# Patient Record
Sex: FEMALE | Race: BLACK OR AFRICAN AMERICAN | Employment: FULL TIME | URBAN - METROPOLITAN AREA
[De-identification: names, ages, dates, MRNs, and addresses within clinical notes are randomized per-mention and may not be internally consistent; named-entity substitution may affect disease eponyms.]

---

## 2023-10-30 ENCOUNTER — LAB ENCOUNTER (OUTPATIENT)
Dept: LAB | Age: 63
End: 2023-10-30
Attending: INTERNAL MEDICINE
Payer: COMMERCIAL

## 2023-10-30 ENCOUNTER — HOSPITAL ENCOUNTER (OUTPATIENT)
Dept: GENERAL RADIOLOGY | Age: 63
Discharge: HOME | End: 2023-10-30
Attending: INTERNAL MEDICINE
Payer: COMMERCIAL

## 2023-10-30 ENCOUNTER — OFFICE VISIT (OUTPATIENT)
Dept: RHEUMATOLOGY | Facility: CLINIC | Age: 63
End: 2023-10-30

## 2023-10-30 ENCOUNTER — TELEPHONE (OUTPATIENT)
Dept: RHEUMATOLOGY | Facility: CLINIC | Age: 63
End: 2023-10-30

## 2023-10-30 VITALS
RESPIRATION RATE: 16 BRPM | DIASTOLIC BLOOD PRESSURE: 66 MMHG | HEART RATE: 68 BPM | WEIGHT: 243 LBS | HEIGHT: 68 IN | BODY MASS INDEX: 36.83 KG/M2 | OXYGEN SATURATION: 99 % | SYSTOLIC BLOOD PRESSURE: 128 MMHG | TEMPERATURE: 98 F

## 2023-10-30 DIAGNOSIS — E11.9 TYPE 2 DIABETES MELLITUS WITHOUT COMPLICATION, WITHOUT LONG-TERM CURRENT USE OF INSULIN (HCC): ICD-10-CM

## 2023-10-30 DIAGNOSIS — M70.71 BURSITIS OF RIGHT HIP, UNSPECIFIED BURSA: ICD-10-CM

## 2023-10-30 DIAGNOSIS — M19.90 OSTEOARTHRITIS, UNSPECIFIED OSTEOARTHRITIS TYPE, UNSPECIFIED SITE: Primary | ICD-10-CM

## 2023-10-30 DIAGNOSIS — M19.90 OSTEOARTHRITIS, UNSPECIFIED OSTEOARTHRITIS TYPE, UNSPECIFIED SITE: ICD-10-CM

## 2023-10-30 DIAGNOSIS — M54.16 LUMBAR RADICULITIS: ICD-10-CM

## 2023-10-30 LAB
ALBUMIN SERPL-MCNC: 3.9 G/DL (ref 3.4–5)
ALBUMIN/GLOB SERPL: 0.9 {RATIO} (ref 1–2)
ALP LIVER SERPL-CCNC: 99 U/L
ALT SERPL-CCNC: 33 U/L
ANION GAP SERPL CALC-SCNC: 3 MMOL/L (ref 0–18)
AST SERPL-CCNC: 22 U/L (ref 15–37)
BASOPHILS # BLD AUTO: 0.02 X10(3) UL (ref 0–0.2)
BASOPHILS NFR BLD AUTO: 0.5 %
BILIRUB SERPL-MCNC: 0.4 MG/DL (ref 0.1–2)
BUN BLD-MCNC: 8 MG/DL (ref 7–18)
CALCIUM BLD-MCNC: 9.8 MG/DL (ref 8.5–10.1)
CHLORIDE SERPL-SCNC: 104 MMOL/L (ref 98–112)
CO2 SERPL-SCNC: 30 MMOL/L (ref 21–32)
CREAT BLD-MCNC: 0.9 MG/DL
CRP SERPL-MCNC: <0.29 MG/DL (ref ?–0.3)
EGFRCR SERPLBLD CKD-EPI 2021: 71 ML/MIN/1.73M2 (ref 60–?)
EOSINOPHIL # BLD AUTO: 0.09 X10(3) UL (ref 0–0.7)
EOSINOPHIL NFR BLD AUTO: 2.3 %
ERYTHROCYTE [DISTWIDTH] IN BLOOD BY AUTOMATED COUNT: 12.7 %
ERYTHROCYTE [SEDIMENTATION RATE] IN BLOOD: 75 MM/HR
FASTING STATUS PATIENT QL REPORTED: YES
GLOBULIN PLAS-MCNC: 4.5 G/DL (ref 2.8–4.4)
GLUCOSE BLD-MCNC: 150 MG/DL (ref 70–99)
HCT VFR BLD AUTO: 41.2 %
HGB BLD-MCNC: 12.9 G/DL
IMM GRANULOCYTES # BLD AUTO: 0.01 X10(3) UL (ref 0–1)
IMM GRANULOCYTES NFR BLD: 0.3 %
LYMPHOCYTES # BLD AUTO: 1.61 X10(3) UL (ref 1–4)
LYMPHOCYTES NFR BLD AUTO: 41.2 %
MCH RBC QN AUTO: 28.9 PG (ref 26–34)
MCHC RBC AUTO-ENTMCNC: 31.3 G/DL (ref 31–37)
MCV RBC AUTO: 92.2 FL
MONOCYTES # BLD AUTO: 0.37 X10(3) UL (ref 0.1–1)
MONOCYTES NFR BLD AUTO: 9.5 %
NEUTROPHILS # BLD AUTO: 1.81 X10 (3) UL (ref 1.5–7.7)
NEUTROPHILS # BLD AUTO: 1.81 X10(3) UL (ref 1.5–7.7)
NEUTROPHILS NFR BLD AUTO: 46.2 %
OSMOLALITY SERPL CALC.SUM OF ELEC: 285 MOSM/KG (ref 275–295)
PLATELET # BLD AUTO: 223 10(3)UL (ref 150–450)
POTASSIUM SERPL-SCNC: 4 MMOL/L (ref 3.5–5.1)
PROT SERPL-MCNC: 8.4 G/DL (ref 6.4–8.2)
RBC # BLD AUTO: 4.47 X10(6)UL
RHEUMATOID FACT SERPL-ACNC: <10 IU/ML (ref ?–15)
SODIUM SERPL-SCNC: 137 MMOL/L (ref 136–145)
THYROGLOB SERPL-MCNC: <15 U/ML (ref ?–60)
THYROPEROXIDASE AB SERPL-ACNC: <28 U/ML (ref ?–60)
URATE SERPL-MCNC: 5.2 MG/DL
VIT B12 SERPL-MCNC: 464 PG/ML (ref 193–986)
VIT D+METAB SERPL-MCNC: 27.6 NG/ML (ref 30–100)
WBC # BLD AUTO: 3.9 X10(3) UL (ref 4–11)

## 2023-10-30 PROCEDURE — 86431 RHEUMATOID FACTOR QUANT: CPT

## 2023-10-30 PROCEDURE — 73560 X-RAY EXAM OF KNEE 1 OR 2: CPT | Performed by: INTERNAL MEDICINE

## 2023-10-30 PROCEDURE — 86140 C-REACTIVE PROTEIN: CPT

## 2023-10-30 PROCEDURE — 86225 DNA ANTIBODY NATIVE: CPT

## 2023-10-30 PROCEDURE — 3078F DIAST BP <80 MM HG: CPT | Performed by: INTERNAL MEDICINE

## 2023-10-30 PROCEDURE — 86200 CCP ANTIBODY: CPT | Performed by: INTERNAL MEDICINE

## 2023-10-30 PROCEDURE — 3074F SYST BP LT 130 MM HG: CPT | Performed by: INTERNAL MEDICINE

## 2023-10-30 PROCEDURE — 86376 MICROSOMAL ANTIBODY EACH: CPT

## 2023-10-30 PROCEDURE — 3008F BODY MASS INDEX DOCD: CPT | Performed by: INTERNAL MEDICINE

## 2023-10-30 PROCEDURE — 85025 COMPLETE CBC W/AUTO DIFF WBC: CPT

## 2023-10-30 PROCEDURE — 86235 NUCLEAR ANTIGEN ANTIBODY: CPT

## 2023-10-30 PROCEDURE — 82607 VITAMIN B-12: CPT

## 2023-10-30 PROCEDURE — 82306 VITAMIN D 25 HYDROXY: CPT

## 2023-10-30 PROCEDURE — 72040 X-RAY EXAM NECK SPINE 2-3 VW: CPT | Performed by: INTERNAL MEDICINE

## 2023-10-30 PROCEDURE — 80053 COMPREHEN METABOLIC PANEL: CPT

## 2023-10-30 PROCEDURE — 87522 HEPATITIS C REVRS TRNSCRPJ: CPT

## 2023-10-30 PROCEDURE — 36415 COLL VENOUS BLD VENIPUNCTURE: CPT

## 2023-10-30 PROCEDURE — 85652 RBC SED RATE AUTOMATED: CPT

## 2023-10-30 PROCEDURE — 86800 THYROGLOBULIN ANTIBODY: CPT

## 2023-10-30 PROCEDURE — 84550 ASSAY OF BLOOD/URIC ACID: CPT

## 2023-10-30 PROCEDURE — 99205 OFFICE O/P NEW HI 60 MIN: CPT | Performed by: INTERNAL MEDICINE

## 2023-10-30 PROCEDURE — 72110 X-RAY EXAM L-2 SPINE 4/>VWS: CPT | Performed by: INTERNAL MEDICINE

## 2023-10-30 RX ORDER — LATANOPROST 50 UG/ML
1 SOLUTION/ DROPS OPHTHALMIC NIGHTLY
COMMUNITY
Start: 2023-07-10

## 2023-10-30 RX ORDER — BLOOD SUGAR DIAGNOSTIC
STRIP MISCELLANEOUS
COMMUNITY
Start: 2023-08-09

## 2023-10-30 RX ORDER — MELOXICAM 7.5 MG/1
7.5 TABLET ORAL 2 TIMES DAILY
Qty: 60 TABLET | Refills: 3 | Status: SHIPPED | OUTPATIENT
Start: 2023-10-30

## 2023-10-30 RX ORDER — METFORMIN HYDROCHLORIDE 500 MG/1
1000 TABLET, EXTENDED RELEASE ORAL 2 TIMES DAILY
COMMUNITY
Start: 2023-10-10

## 2023-10-30 RX ORDER — GLIPIZIDE 5 MG/1
5 TABLET, FILM COATED, EXTENDED RELEASE ORAL DAILY
COMMUNITY
Start: 2023-08-01

## 2023-10-30 RX ORDER — LISINOPRIL 2.5 MG/1
2.5 TABLET ORAL DAILY
COMMUNITY
Start: 2023-08-07

## 2023-10-30 NOTE — TELEPHONE ENCOUNTER
FINDINGS:      BONES:  There is mild straightening of the lumbar lordosis. There is mild anterior spondylolisthesis of L4 on L5. There is marked facet arthropathy L4-5 and L5-S1 with milder changes at L2-3 and L3-4 bilaterally. There are numerous endplate   osteophytes. No acute fracture. DISC SPACES:  There is disc narrowing L2-3 through L5-S1 most severe in the lower lumbar spine. PARASPINOUS:  Negative. No paraspinous abnormality is seen. OTHER:  Negative.           As suspected there is moderate to severe arthritis of the lumbar spine    Cervical spine shows mild arthritis throughout the spine    Knee x-rays show mild osteoarthritis    Let her know as other labs come back same plan with proceeding with MRI of the lumbar spine

## 2023-10-31 DIAGNOSIS — E55.9 VITAMIN D DEFICIENCY: Primary | ICD-10-CM

## 2023-10-31 LAB
CCP IGG SERPL-ACNC: 1.1 U/ML (ref 0–6.9)
DSDNA IGG SERPL IA-ACNC: <0.6 IU/ML
ENA SM IGG SER IA-ACNC: <0.7 U/ML
ENA SS-A IGG SER IA-ACNC: <0.4 U/ML
ENA SS-B IGG SER IA-ACNC: <0.4 U/ML

## 2023-10-31 NOTE — TELEPHONE ENCOUNTER
0 Result Notes      Component  Ref Range & Units 1 d ago   Sed Rate  0 - 30 mm/Hr 75 High    Resulting Agency [facility])        essible in [address])       Dx: Osteoarthritis, unspecified osteoarth. ..    0 Result Notes      Component  Ref Range & Units 1 d ago   Vitamin D, 25OH, Total  30.0 - 100.0 ng/mL 27.6 Low         ESR still elevated. Vitamin D is low at 27.6 needs high-dose vitamin D. Awaiting further imaging and other labs to come back for having more definitive plan of treatment.   Same plan for meloxicam for now

## 2023-10-31 NOTE — TELEPHONE ENCOUNTER
Spoke to patient regarding the below message. Patient verbalized understanding and denied any questions at this time. Patient states that she will continue the Meloxicam and she will  the high dose vitamin D tomorrow and begin taking it.

## 2023-10-31 NOTE — TELEPHONE ENCOUNTER
Patient notified of results and plan. We will schedule MRI when she gets her labs back as discussed.

## 2023-11-01 RX ORDER — ERGOCALCIFEROL 1.25 MG/1
50000 CAPSULE ORAL WEEKLY
Qty: 12 CAPSULE | Refills: 0 | Status: SHIPPED | OUTPATIENT
Start: 2023-11-01 | End: 2023-12-01

## 2023-11-09 ENCOUNTER — TELEPHONE (OUTPATIENT)
Dept: RHEUMATOLOGY | Facility: CLINIC | Age: 63
End: 2023-11-09

## 2023-11-09 LAB
ENA RNP IGG SER IA-ACNC: 1 U/ML
U1 SNRNP IGG SER IA-ACNC: 2.1 U/ML

## 2023-11-09 NOTE — TELEPHONE ENCOUNTER
0 Result Notes      Component  Ref Range & Units 10/30/23 12:06 PM   Sed Rate  0 - 30 mm/Hr 75          Immune testing is normal elevated ESR 75. No history suggestive of PMR. Or autoimmune disease otherwise we will continue to monitor this    I have started meloxicam    Offered gabapentin      X-rays of the lumbar spine I have recommended MRI of the lumbar spine and suspect radiculopathy    Let me know if she would like to proceed    She is interested in the gabapentin she can let us know as well. We will continue to follow the ESR and sed rate.   If it continues to go higher and symptoms worsen we could always consider a steroid trial but I am trying to put this off for now since she is diabetic

## 2023-11-13 NOTE — TELEPHONE ENCOUNTER
Pt returning our call, notified of Dr. Ct Parrish result note. 0 Result Notes        Component  Ref Range & Units 10/30/23 12:06 PM   Sed Rate  0 - 30 mm/Hr 75            Immune testing is normal elevated ESR 75. No history suggestive of PMR. Or autoimmune disease otherwise we will continue to monitor this     I have started meloxicam     Offered gabapentin        X-rays of the lumbar spine I have recommended MRI of the lumbar spine and suspect radiculopathy     Let me know if she would like to proceed     She is interested in the gabapentin she can let us know as well. We will continue to follow the ESR and sed rate. If it continues to go higher and symptoms worsen we could always consider a steroid trial but I am trying to put this off for now since she is diabetic         Pt voices understanding, pt would like to move forward with Gabapentin. Pt has not yet started the Meloxicam. Pt is concerned about taking the medication with Lisinopril. Pt has MRI scheduled.

## 2023-11-13 NOTE — TELEPHONE ENCOUNTER
Called pt, Lvm for pt to call our office to discuss Dr. Sarah Aparicio response to call earlier today. Advised pt to call tomorrow when office opens.

## 2023-11-14 ENCOUNTER — TELEPHONE (OUTPATIENT)
Dept: RHEUMATOLOGY | Facility: CLINIC | Age: 63
End: 2023-11-14

## 2023-11-15 ENCOUNTER — TELEPHONE (OUTPATIENT)
Dept: RHEUMATOLOGY | Facility: CLINIC | Age: 63
End: 2023-11-15

## 2023-11-15 NOTE — TELEPHONE ENCOUNTER
Pcp office called asking for last office note and lab results to be faxed over.  Faxed these to [phone] on 11/15/2023

## 2023-11-27 ENCOUNTER — HOSPITAL ENCOUNTER (OUTPATIENT)
Dept: MRI IMAGING | Age: 63
Discharge: HOME | End: 2023-11-27
Attending: INTERNAL MEDICINE
Payer: COMMERCIAL

## 2023-11-27 DIAGNOSIS — M54.16 LUMBAR RADICULITIS: ICD-10-CM

## 2023-11-27 PROCEDURE — 72148 MRI LUMBAR SPINE W/O DYE: CPT | Performed by: INTERNAL MEDICINE

## 2023-11-28 ENCOUNTER — TELEPHONE (OUTPATIENT)
Dept: RHEUMATOLOGY | Facility: CLINIC | Age: 63
End: 2023-11-28

## 2023-11-28 NOTE — TELEPHONE ENCOUNTER
MRI of the lumbar spine as suspected shows severe disc arthritis narrowing and disc bulging as below    Depending on where patients live would recommend pain management referral and physical therapy    I had given her meloxicam but she declined gabapentin. Let me know if she changes her mind on that. It is for nerve pain    I had given her written information on that. If she is open to physical therapy we can give her referral for that and also information if she lives near [place] [id number] cross to Dr. Davenport Comes from pain management if she wants to be local then we can give her name to a local pain management at [facility]    [facility]   [address] [facility], [address] [place] [address] [phone]              FINDINGS:    LUMBAR DISC LEVELS  L1-L2:  No significant disc disease. Mild bilateral posterior articular facet joint osteoarthritis noted. No evidence of stenosis. L2-L3:  There is mild disc desiccation and minimal annular disc bulge. There is mild bilateral facet arthropathy and ligamentum flavum thickening. No evidence of stenosis. L3-L4:  There is mild disc desiccation. There is mild bilateral facet arthropathy with ligamentum flavum thickening. No significant stenosis. L4-L5:  Mild disc desiccation and mild annular disc bulge noted. There is severe bilateral facet arthropathy with hypertrophy and ligamentum flavum thickening. There is a synovial cyst projecting anteromedially into the central canal from the  left-sided articular facets that measures approximately 1.5 x 1.0 x 0.8 cm in craniocaudal, AP and transverse dimensions respectively. There is secondary moderate central canal stenosis. No significant subarticular or neural foraminal stenosis noted. L5-S1:  Mild disc desiccation, mild disc height loss and mild annular disc bulge. Mild to moderate bilateral facet arthropathy. No significant stenosis. PARASPINAL AREA:  Normal with no visible mass.     BONY STRUCTURES: Normal alignment of the lumbar spine. No acute osseous injuries are noted. No lytic, blastic or destructive osseous changes are identified. There is multilevel facet osteoarthritis involving the posterior articular facets, most  pronounced at L4-5 as described above. CORD/CAUDA EQUINA:  Normal caliber, contour, and signal intensity. Impression  CONCLUSION:    1. There is multilevel disc disease and facet arthropathy, most pronounced at L4-5 where there is mild desiccation, annular disc bulge and severe bilateral facet arthropathy. There is an associated synovial cyst projecting anteromedially from the  left-sided facets into the central canal that measures approximately 1.5 cm in maximal dimension. There is secondary moderate central canal stenosis, likely accounting for the patient's symptoms. 2. No additional regions of significant stenosis within the lumbar spine identified. Please see the body of the report above for further, specific details regarding each individual lumbar level.

## 2023-11-28 NOTE — TELEPHONE ENCOUNTER
Called pt and reviewed Dr. Daryle Divine result note. MRI of the lumbar spine as suspected shows severe disc arthritis narrowing and disc bulging as below     Depending on where patients live would recommend pain management referral and physical therapy     I had given her meloxicam but she declined gabapentin. Let me know if she changes her mind on that. It is for nerve pain     I had given her written information on that. If she is open to physical therapy we can give her referral for that and also information if she lives near [place] [id number] cross to Dr. Mary Contreras from pain management if she wants to be local then we can give her name to a local pain management at [facility]     [facility]   [address] [address], [address]  Ph [phone]        Pt asking appropriate questions, pt would like referral for PT placed at [facility] in [place]. Pt declines referral to pain mgmt at this time. Will call our office with further concerns.

## 2024-01-19 DIAGNOSIS — E55.9 VITAMIN D DEFICIENCY: ICD-10-CM

## 2024-01-19 RX ORDER — ERGOCALCIFEROL 1.25 MG/1
50000 CAPSULE ORAL WEEKLY
Qty: 12 CAPSULE | Refills: 0 | OUTPATIENT
Start: 2024-01-19

## 2024-02-06 ENCOUNTER — OFFICE VISIT (OUTPATIENT)
Dept: RHEUMATOLOGY | Facility: CLINIC | Age: 64
End: 2024-02-06
Payer: COMMERCIAL

## 2024-02-06 ENCOUNTER — LAB ENCOUNTER (OUTPATIENT)
Dept: LAB | Age: 64
End: 2024-02-06
Attending: INTERNAL MEDICINE
Payer: COMMERCIAL

## 2024-02-06 VITALS
BODY MASS INDEX: 35.61 KG/M2 | DIASTOLIC BLOOD PRESSURE: 80 MMHG | HEIGHT: 68 IN | SYSTOLIC BLOOD PRESSURE: 112 MMHG | OXYGEN SATURATION: 95 % | HEART RATE: 80 BPM | TEMPERATURE: 98 F | RESPIRATION RATE: 16 BRPM | WEIGHT: 235 LBS

## 2024-02-06 DIAGNOSIS — E11.9 TYPE 2 DIABETES MELLITUS WITHOUT COMPLICATION, WITHOUT LONG-TERM CURRENT USE OF INSULIN (HCC): ICD-10-CM

## 2024-02-06 DIAGNOSIS — R70.0 SEDIMENTATION RATE ELEVATION: ICD-10-CM

## 2024-02-06 DIAGNOSIS — M19.90 OSTEOARTHRITIS, UNSPECIFIED OSTEOARTHRITIS TYPE, UNSPECIFIED SITE: Primary | ICD-10-CM

## 2024-02-06 DIAGNOSIS — M19.90 OSTEOARTHRITIS, UNSPECIFIED OSTEOARTHRITIS TYPE, UNSPECIFIED SITE: ICD-10-CM

## 2024-02-06 DIAGNOSIS — M48.062 SPINAL STENOSIS OF LUMBAR REGION WITH NEUROGENIC CLAUDICATION: ICD-10-CM

## 2024-02-06 PROBLEM — M48.061 LUMBAR STENOSIS: Status: ACTIVE | Noted: 2024-02-06

## 2024-02-06 LAB
ALBUMIN SERPL-MCNC: 3.9 G/DL (ref 3.4–5)
ALBUMIN/GLOB SERPL: 0.9 {RATIO} (ref 1–2)
ALP LIVER SERPL-CCNC: 104 U/L
ALT SERPL-CCNC: 24 U/L
ANION GAP SERPL CALC-SCNC: 4 MMOL/L (ref 0–18)
AST SERPL-CCNC: 23 U/L (ref 15–37)
BASOPHILS # BLD AUTO: 0.01 X10(3) UL (ref 0–0.2)
BASOPHILS NFR BLD AUTO: 0.2 %
BILIRUB SERPL-MCNC: 0.2 MG/DL (ref 0.1–2)
BUN BLD-MCNC: 11 MG/DL (ref 9–23)
CALCIUM BLD-MCNC: 9.7 MG/DL (ref 8.5–10.1)
CHLORIDE SERPL-SCNC: 108 MMOL/L (ref 98–112)
CO2 SERPL-SCNC: 27 MMOL/L (ref 21–32)
CREAT BLD-MCNC: 0.92 MG/DL
CRP SERPL-MCNC: <0.29 MG/DL (ref ?–0.3)
EGFRCR SERPLBLD CKD-EPI 2021: 70 ML/MIN/1.73M2 (ref 60–?)
EOSINOPHIL # BLD AUTO: 0.15 X10(3) UL (ref 0–0.7)
EOSINOPHIL NFR BLD AUTO: 2.4 %
ERYTHROCYTE [DISTWIDTH] IN BLOOD BY AUTOMATED COUNT: 13 %
ERYTHROCYTE [SEDIMENTATION RATE] IN BLOOD: 91 MM/HR
FASTING STATUS PATIENT QL REPORTED: NO
GLOBULIN PLAS-MCNC: 4.2 G/DL (ref 2.8–4.4)
GLUCOSE BLD-MCNC: 73 MG/DL (ref 70–99)
HCT VFR BLD AUTO: 40.9 %
HGB BLD-MCNC: 13.1 G/DL
IMM GRANULOCYTES # BLD AUTO: 0.01 X10(3) UL (ref 0–1)
IMM GRANULOCYTES NFR BLD: 0.2 %
LYMPHOCYTES # BLD AUTO: 2.44 X10(3) UL (ref 1–4)
LYMPHOCYTES NFR BLD AUTO: 39.7 %
MCH RBC QN AUTO: 29.4 PG (ref 26–34)
MCHC RBC AUTO-ENTMCNC: 32 G/DL (ref 31–37)
MCV RBC AUTO: 91.7 FL
MONOCYTES # BLD AUTO: 0.62 X10(3) UL (ref 0.1–1)
MONOCYTES NFR BLD AUTO: 10.1 %
NEUTROPHILS # BLD AUTO: 2.92 X10 (3) UL (ref 1.5–7.7)
NEUTROPHILS # BLD AUTO: 2.92 X10(3) UL (ref 1.5–7.7)
NEUTROPHILS NFR BLD AUTO: 47.4 %
OSMOLALITY SERPL CALC.SUM OF ELEC: 286 MOSM/KG (ref 275–295)
PLATELET # BLD AUTO: 257 10(3)UL (ref 150–450)
POTASSIUM SERPL-SCNC: 4 MMOL/L (ref 3.5–5.1)
PROT SERPL-MCNC: 8.1 G/DL (ref 6.4–8.2)
RBC # BLD AUTO: 4.46 X10(6)UL
SODIUM SERPL-SCNC: 139 MMOL/L (ref 136–145)
URATE SERPL-MCNC: 4.9 MG/DL
WBC # BLD AUTO: 6.2 X10(3) UL (ref 4–11)

## 2024-02-06 PROCEDURE — 3008F BODY MASS INDEX DOCD: CPT | Performed by: INTERNAL MEDICINE

## 2024-02-06 PROCEDURE — 3074F SYST BP LT 130 MM HG: CPT | Performed by: INTERNAL MEDICINE

## 2024-02-06 PROCEDURE — 86140 C-REACTIVE PROTEIN: CPT

## 2024-02-06 PROCEDURE — 84550 ASSAY OF BLOOD/URIC ACID: CPT

## 2024-02-06 PROCEDURE — 85025 COMPLETE CBC W/AUTO DIFF WBC: CPT

## 2024-02-06 PROCEDURE — 36415 COLL VENOUS BLD VENIPUNCTURE: CPT

## 2024-02-06 PROCEDURE — 80053 COMPREHEN METABOLIC PANEL: CPT

## 2024-02-06 PROCEDURE — 99214 OFFICE O/P EST MOD 30 MIN: CPT | Performed by: INTERNAL MEDICINE

## 2024-02-06 PROCEDURE — 85652 RBC SED RATE AUTOMATED: CPT

## 2024-02-06 PROCEDURE — 3079F DIAST BP 80-89 MM HG: CPT | Performed by: INTERNAL MEDICINE

## 2024-02-06 RX ORDER — TOBRAMYCIN AND DEXAMETHASONE 3; 1 MG/ML; MG/ML
1 SUSPENSION/ DROPS OPHTHALMIC 4 TIMES DAILY
COMMUNITY
Start: 2024-01-30

## 2024-02-06 RX ORDER — MELOXICAM 7.5 MG/1
7.5 TABLET ORAL 2 TIMES DAILY
COMMUNITY
Start: 2024-02-06

## 2024-02-06 NOTE — PATIENT INSTRUCTIONS
OSTEOARTHRITIS    Fast Facts    Though some of the joint changes are irreversible, most patients will not need joint replacement surgery.    OA symptoms (what you feel) can vary greatly among patients.    A rheumatologist can detect arthritis and prescribe the proper treatment. The goal of treatment in OA is to reduce pain and improve function.    Exercise is an important part of OA treatment, because it can decrease joint pain and improve function.    At present, there is no treatment that can reverse the damage of OA in the joints. Researchers are trying to find ways to slow or reverse this joint damage.    Osteoarthritis (also known as OA) is a common joint disease that most often affects middle-age to elderly people. It is commonly referred to as \"wear and tear\" of the joints, but we now know that OA is a disease of the entire joint, involving the cartilage, joint lining, ligaments, and bone. Although it is more common in older people, it is not really accurate to say that the joints are just \"wearing out.\" It is characterized by breakdown of the cartilage (the tissue that cushions the ends of the bones between joints), bony changes of the joints, deterioration of tendons and ligaments, and various degrees of inflammation of the joint lining (called the synovium).    This arthritis tends to occur in the hand joints, spine, hips, knees, and great toes. The lifetime risk of developing OA of the knee is about 46%, and the lifetime risk of developing OA of the hip is 25%, according to the [facility] Osteoarthritis Project, a long-term study from the [facility] and sponsored by the Centers for Disease Control and Prevention (often called the CDC) and the National Institutes of Health.    OA is a top cause of disability in older people. The goal of osteoarthritis treatment is to reduce pain and improve function. There is no cure for the disease, but some treatments attempt to slow disease  progression.         What is osteoarthritis?    OA is a frequently slowly progressive joint disease typically seen in middle-aged to elderly people. In osteoarthritis, the cartilage between the bones in the joint breaks down. This causes the affected bones to slowly get bigger. The joint cartilage often breaks down because of mechanical stress or biochemical changes within the body, causing the bone underneath to fail. OA can occur together with other types of arthritis, such as gout or rheumatoid arthritis.    OA tends to affect commonly used joints such as the hands and spine, and the weight-bearing joints such as the hips and knees. Symptoms include:    Joint pain and stiffness    Knobby swelling at the joint    Cracking or grinding noise with joint movement    Decreased function of the joint    How do you treat osteoarthritis?    There is no proven treatment yet that can reverse joint damage from OA. The goal of osteoarthritis treatment is to reduce pain and improve function of the affected joints. Most often, this is possible with a mixture of physical measures and drug therapy and, sometimes, surgery.    Physical measures: Weight loss and exercise are useful in OA. Excess weight puts stress on your knee joints and hips and low back. For every 10 pounds of weight you lose over 10 years, you can reduce the chance of developing knee OA by up to 50 percent. Exercise can improve your muscle strength, decrease joint pain and stiffness, and lower the chance of disability due to OA. Also helpful are support (\"assistive\") devices, such as orthotics or a walking cane, that help you do daily activities. Heat or cold therapy can help relieve OA symptoms for a short time.    Certain alternative treatments such as spa (hot tub), massage, and chiropractic manipulation can help relieve pain for a short time. They can be costly, though, and require repeated treatments. Also, the long-term benefits of these alternative  (sometimes called complementary or integrative) medicine treatments are unproven but are under study.    Drug therapy: Forms of drug therapy include topical, oral (by mouth) and injections (shots). You apply topical drugs directly on the skin over the affected joints. These medicines include capsaicin cream, lidocaine and diclofenac gel. Oral pain relievers such as acetaminophen are common first treatments. So are nonsteroidal anti-inflammatory drugs (often called NSAIDs), which decrease swelling and pain.    In 2010, the government (FDA) approved the use of duloxetine (Cymbalta) for chronic (long-term) musculoskeletal pain including from OA. This oral drug is not new. It also is in use for other health concerns, such as mood disorders, nerve pain and fibromyalgia.    Patients with more serious pain may need stronger medications, such as prescription narcotics.    Joint injections with corticosteroids (sometimes called cortisone shots) or with a form of lubricant called hyaluronic acid can give months of pain relief from OA. This lubricant is given in the knee, and these shots may help delay the need for a knee replacement by a few years in some patients.    Surgery: Surgical treatment becomes an option for severe cases. This includes when the joint has serious damage, or when medical treatment fails to relieve pain and you have major loss of function. Surgery may involve arthroscopy, repair of the joint done through small incisions (cuts). If the joint damage cannot be repaired, you may need a joint replacement.    Supplements: Many over-the-counter nutrition supplements have been used for osteoarthritis treatment. Most lack good research data to support their effectiveness and safety. Among the most widely used are calcium, vitamin D and omega-3 fatty acids. To ensure safety and avoid drug interactions, consult your doctor or pharmacist before using any of these supplements. This is especially true when you are  combining these supplements with prescribed

## 2024-02-06 NOTE — PROGRESS NOTES
[facility], [address] [place] [address]      Consult     Caitlin Ignacio Patient Status:  No patient class for patient encounter    1960 MRN [id number]   Location [facility], [address] [place] [address] Attending No att. providers found   Hosp Day # 0 PCP Angel Anton DO     Referring Provider:   Reason for Consultation:     Subjective:    Caitlin Ignacio is a 63 year old female with further evaluation of elevated esr with last value of 2023 of 53;     Started having hip pain on the right about a year; achiness of right lower extremity     In the morning limps for a little bit and then turn and walk then improves; worsens with sitting; low buttock area and no weakness; painful to touch.  Does have groin pain when she externally rotates her hip.     Did hip x rays at [facility] unknown results; an send to PT; was for leg; around a years and half ago; no urine or bowel issues or incontinence     Denies any pain in her shoulders, elbows , wrists or hands (at times right hand but more right hand) occasional at the tip where there is a nodule; no swelling      Has right knee pain (no swelling) ; no low back; +hip pain (on right)      Denies any major issues with her neck     Denies any history of DVT PE TIA CVA seizures migraines or headaches     States no shortness of breath ,chest pain ,fevers ,chills     States no urinary or bowel symptoms; bloody stools (mammogram normal; colonoscopy was normal in )      States no headaches jaw pain, vision changes      States no history of pericardial, pleural effusions     States no significant dry eyes or dry mouth (puts drops in eyes for glaucoma) stable; sees optho every 4 months (had pain in left eye; tobramycin/? Infection) (Dr. Phelan)      States no history of uveitis iritis scleritis     States no history of Raynaud's or digital ulcerations     States no major weight changes; night sweats     Her weight has been  stable     States no history of photosensitive or malar rash.     States no history of psoriasis     Denies any depression anxiety or insomnia       with alzheimers (takes care of him) some sleep issues from that     Was walking before this issue with right lower leg; last 4 weeks slowed down; walking is fine it hurts more when stiting      Last Hb 1 c was 7.3 (not on insulin) tried tramadol (some help)      Extensive autoimmune testing labs were unrevealing other than chronically elevated ESR CRP was normal    Back to disc bulging stenosis and arthritis MRI reviewed showing moderate to severe stenosis and disc bulging at multiple levels    She was given meloxicam but she was reluctant to take it.  She also declined gabapentin.  Instead she has lost about 10 to 15 pounds intentionally    Her pain has improved she never ended up going to pain management as we had recommended.  She states she will consider taking meloxicam as needed instead.  She is walking more and she has no concerns.  No swelling of her joints    She has no specific difficulty with movement otherwise.  No headaches no jaw pain no vision changes overall pain levels are minimal.  She states she is due for another colonoscopy shortly but is updated on mammogram and Pap smears       History/Other:        Past Medical History:  Past Medical History:   Diagnosis Date    Diabetes (HCC)         Past Surgical History:   Past Surgical History:   Procedure Laterality Date    HERNIA REPAIR  2011       Social History:  reports that she has never smoked. She has never used smokeless tobacco. She reports that she does not currently use alcohol. She reports that she does not use drugs.    Family History: History reviewed. No pertinent family history.    Allergies: No Known Allergies    Current Medications:  Current Outpatient Medications   Medication Sig Dispense Refill    tobramycin-dexamethasone 0.3-0.1 % Ophthalmic Suspension Place 1 drop into the right  eye 4 (four) times daily.      glipiZIDE ER 5 MG Oral Tablet 24 Hr Take 1 tablet (5 mg total) by mouth daily.      ONETOUCH ULTRA In Vitro Strip       latanoprost 0.005 % Ophthalmic Solution Place 1 drop into both eyes nightly.      lisinopril 2.5 MG Oral Tab Take 1 tablet (2.5 mg total) by mouth daily.      metFORMIN  MG Oral Tablet 24 Hr Take 2 tablets (1,000 mg total) by mouth 2 (two) times daily.            (Not in a hospital admission)    Wt Readings from Last 6 Encounters:   02/06/24 235 lb (106.6 kg)   10/30/23 243 lb (110.2 kg)        Review of Systems:     Constitutional: Negative for chills, , fatigue, fever and unexpected weight change.    HENT: Negative for congestion, and mouth sores.    Eyes: Negative for photophobia, pain, redness and visual disturbance.    Respiratory: Negative for apnea, cough, chest tightness, shortness of breath, wheezing and stridor.    Cardiovascular: Negative for chest pain, palpitations and leg swelling.    Gastrointestinal: Negative for abdominal distention, abdominal pain, blood in stool, constipation, diarrhea and nausea.    Endocrine: Negative.     Genitourinary: Negative for decreased urine volume, difficulty urinating, dyspareunia, dysuria, flank pain, and frequency.    Musculoskeletal: Occasional arthralgias, no gait problem and joint swelling.    Skin: Negative for color change, pallor and rash. No raynauds or digital ulcerations no sclerodactly.    Allergic/Immunologic: Negative.    Neurological: Negative for dizziness, tremors, seizures, syncope, speech difficulty, weakness, light-headedness, numbness and headaches.    Hematological: Does not bruise/bleed easily.    Psychiatric/Behavioral: Negative for confusion, decreased concentration, hallucinations, self-injury, sleep disturbance and suicidal ideas or depression.    Objective:   [unfilled]  Vitals:    02/06/24 1256   BP: 112/80   Pulse: 80   Resp: 16   Temp: 98 °F (36.7 °C)          Constitutional:  is oriented to person, place, and time. Appears well-developed and well-nourished. No distress.    HEENT: Normocephalic; EOMI; no jvd; no LAD; no oral or nasal ulcers.     Eyes: Conjunctivae and EOM are normal. Pupils are equal, round, and reactive to light.     Neck: Normal range of motion. No thyromegaly present.    Cardiovascular: RRR, no murmurs.    Lungs: Clear, Bilateral air entry, no wheezes.    Abdominal: Soft.    Musculoskeletal:    [facility]. Go to the Rheumatology activity and [facility]     Joint Exam 02/06/2024     No joint exam has been documented for this visit        Swollen: -- 0    Tender: -- 0        Right shoulder: Exhibits normal range of motion on abduction and internal rotation, no tenderness, no bony tenderness, no deformity, no laceration, no pain and no spasm.        Left shoulder: Exhibits normal range of motion on abduction and internal and external rotation.  no tenderness, no bony tenderness, no swelling, no effusion, no deformity, no pain, no spasm and normal strength.        Right elbow:  Exhibits normal range of motion, no swelling, no effusion and no deformity. No tenderness found. No medial epicondyle, no lateral epicondyle and no olecranon process tenderness noted. There are no contractures or tophi or nodules.        Left elbow:  Normal range of motion, no swelling, no effusion and no deformity. No medial epicondyle, no lateral epicondyle and no olecranon process tenderness noted. There are no contractures or tophi or nodules.        Right wrist:  Exhibits normal range of motion, no tenderness, no bony tenderness, no swelling, no effusion and no crepitus. Flexion and extension intact w/o limitation.        Left wrist: Exhibits normal range of motion, no tenderness, no bony tenderness, no swelling, no effusion, no crepitus and no deformity. Flexion and extension intact without limitation.        Right  hip: Exhibits normal range of motion, normal strength, no tenderness, no bony tenderness, no swelling and no crepitus.        Right hand: No synovitis of MCP,PIP or DIP joints; no Bouchards few scattered Heberden nodules noted;  strength: 100%.  Mild squaring first CMC joint        Left hand: No synovitis of MCP,PIP or DIP joints; no Bouchards few scattered Heberden nodules noted;  strength: 100%.  Mild squaring first CMC joint        Left hip: Exhibits normal range of motion, normal strength, no tenderness, no bony tenderness, No swelling and no crepitus.        Right knee: Exhibits normal range of motion, no swelling, no effusion, no ecchymosis, no deformity and no erythema. No tenderness found. No medial joint line, no lateral joint line, no MCL and no LCL tenderness noted. mild crepitation on flexion of knee and extension normal.        Left knee:  Exhibits normal range of motion, no swelling, no effusion, no ecchymosis and no erythema. No tenderness found. No medial joint line, no lateral joint line and no patellar tendon tenderness noted. mild crepitation on flexion of the knee. Extension intact and normal.        Right ankle: No swelling, no deformity. No tenderness. Dorsiflexion and plantar flexion intact without limitation in range of motion.        Left ankle: Exhibits no swelling. No tenderness. No lateral malleolus and no medial malleolus tenderness found. Achilles tendon normal. Achilles tendon exhibits no pain, no defect and normal Tello's test results.  Dorsiflexion and plantar flexion intact without limitation in range of motion.        Cervical back: Exhibits normal range of motion, no tenderness, no bony tenderness, no swelling, no pain and no spasm.        Thoracic back: Exhibits normal range of motion, no tenderness, no bony tenderness and no spasm.        Lumbar back:  Exhibits normal range of motion, no tenderness, no bony tenderness, no pain and no spasm.        Right foot: normal.  There is normal range of motion, no tenderness, no bony tenderness, no crepitus and no laceration. There is no synovitis or tenderness of the MTP joints to palpation.  Bony enlargement MTP joint        Left foot: normal. There is normal range of motion, no tenderness, no bony tenderness and no crepitus. There is no synovitis or tenderness of the MTP joints to palpation.  Bony enlargement MTP joints    Lymphadenopathy: No submental, no submandibular, and no occipital adenopathy present, has no cervical adenopathy or axillary lympadenopathy.    Neurological: Alert and oriented. No focal motor or sensory abnormalities. Strength is 5/5 Upper Extremities/Lower Extremities proximally and distally.    Skin: Skin is warm, dry and intact.    Psychiatric: Normal behavior.    Results:    Labs:      Lab Results   Component Value Date    WBC 3.9 (L) 10/30/2023    RBC 4.47 10/30/2023    HGB 12.9 10/30/2023    HCT 41.2 10/30/2023    MCV 92.2 10/30/2023    MCH 28.9 10/30/2023    MCHC 31.3 10/30/2023    RDW 12.7 10/30/2023    .0 10/30/2023       No components found for: \"RELY\", \"NMET\", \"MYEL\", \"PROMY\", \"KIAN\", \"ABSNEUTS\", \"ABSBANDS\", \"ABMM\", \"ABMY\", \"ABPM\", \"ABBL\"      Lab Results   Component Value Date     10/30/2023    K 4.0 10/30/2023    CO2 30.0 10/30/2023    BUN 8 10/30/2023    ALB 3.9 10/30/2023    AST 22 10/30/2023    ALT 33 10/30/2023          No components found for: \"ESRWESTERGRN\"       Lab Results   Component Value Date    CRP <0.29 10/30/2023         No results found for: \"COLOR\", \"CLARITY\", \"UROBILINOGEN\", \"YEAST\"  @LABRCNTIP(RF,B12)@      [unfilled]    Imaging:  MRI SPINE LUMBAR (CPT=72148)    Result Date: 11/28/2023  CONCLUSION:  1. There is multilevel disc disease and facet arthropathy, most pronounced at L4-5 where there is mild desiccation, annular disc bulge and severe bilateral facet arthropathy.  There is an associated synovial cyst projecting anteromedially from the left-sided facets into the  central canal that measures approximately 1.5 cm in maximal dimension.  There is secondary moderate central canal stenosis, likely accounting for the patient's symptoms. 2. No additional regions of significant stenosis within the lumbar spine identified.  Please see the body of the report above for further, specific details regarding each individual lumbar level.   LOCATION:  [facility]   Dictated by (CST): Juve Baez DO on 11/28/2023 at 8:32 AM     Finalized by (CST): Juve Baez DO on 11/28/2023 at 8:40 AM        Assessment & Plan:      63-year-old woman comes in for further evaluation for on and off right hip pain for the last year with signs of right lower extremity neuropathy and radiculopathy     Moderate to severe lumbar stenosis and disc bulges lumbar spine  Osteoarthritis multiple joints  Elevated ESR likely multifactorial no clear history suggestive of PMR or GCA  Mild right trochanteric bursitis  Current eye chalazion with antibiotics and steroid drops improving through ophthalmology       Reviewed x-ray and lumbar spine MRI.  Patient declined gabapentin pain medications or taking anti-inflammatories regularly  He states pain levels have improved  Offered gabapentin but patient would like to hold on this.  She would like to avoid steroids because of diabetes type 2.  Her last hemoglobin A1c was 7.3  Suspect neuropathic pain  Mild trochanteric bursitis as well on the right side  We will start meloxicam 7.5 mg twice a day as needed for arthritic pain/bursitis and likely lumbar radiculitis.  Risk of NSAIDs discussed.  If she stays on this long-term we will consider ranitidine  States she will consider this again    He is doing exercises on her own is not interested in referral to pain management or any other interventions or medications at this time    No history suggestive of PMR or GCA    Age-appropriate malignancy screening through PCP    We will monitor for evolving connective tissue disease     We will  check autoimmune testing despite clinical suspicion being on the lower end for underlying connective tissue disease.     Would like to obtain last DEXA scan per patient done at [facility] along with her other additional labs.    Will update inflammatory markers to have new baseline    Education and counseling provided to patient.  Instructed patient to call my office or seek medical attention immediately if symptoms worsen. Risks and side effects of medications and diagnosis discussed in detail and patient was given written information on new prescribed medications.    Return to clinic:  Return in about 6 months (around 8/6/2024).    Isa Donis MD  2/6/2024

## 2024-08-06 ENCOUNTER — LAB ENCOUNTER (OUTPATIENT)
Dept: LAB | Age: 64
End: 2024-08-06
Attending: INTERNAL MEDICINE
Payer: COMMERCIAL

## 2024-08-06 ENCOUNTER — OFFICE VISIT (OUTPATIENT)
Dept: RHEUMATOLOGY | Facility: CLINIC | Age: 64
End: 2024-08-06
Payer: COMMERCIAL

## 2024-08-06 VITALS
OXYGEN SATURATION: 99 % | HEIGHT: 68 IN | SYSTOLIC BLOOD PRESSURE: 124 MMHG | DIASTOLIC BLOOD PRESSURE: 68 MMHG | WEIGHT: 213 LBS | BODY MASS INDEX: 32.28 KG/M2 | HEART RATE: 101 BPM | TEMPERATURE: 98 F | RESPIRATION RATE: 16 BRPM

## 2024-08-06 DIAGNOSIS — M15.0 PRIMARY OSTEOARTHRITIS INVOLVING MULTIPLE JOINTS: ICD-10-CM

## 2024-08-06 DIAGNOSIS — M15.0 PRIMARY OSTEOARTHRITIS INVOLVING MULTIPLE JOINTS: Primary | ICD-10-CM

## 2024-08-06 DIAGNOSIS — E11.9 TYPE 2 DIABETES MELLITUS WITHOUT COMPLICATION, WITHOUT LONG-TERM CURRENT USE OF INSULIN (HCC): ICD-10-CM

## 2024-08-06 DIAGNOSIS — M48.061 SPINAL STENOSIS OF LUMBAR REGION WITHOUT NEUROGENIC CLAUDICATION: ICD-10-CM

## 2024-08-06 DIAGNOSIS — Z79.1 ENCOUNTER FOR LONG-TERM (CURRENT) USE OF NSAIDS: ICD-10-CM

## 2024-08-06 DIAGNOSIS — R70.0 SEDIMENTATION RATE ELEVATION: ICD-10-CM

## 2024-08-06 DIAGNOSIS — M70.61 TROCHANTERIC BURSITIS OF RIGHT HIP: ICD-10-CM

## 2024-08-06 PROBLEM — M70.71 BURSITIS OF RIGHT HIP: Status: RESOLVED | Noted: 2023-10-30 | Resolved: 2024-08-06

## 2024-08-06 PROBLEM — M54.16 LUMBAR RADICULITIS: Status: RESOLVED | Noted: 2023-10-30 | Resolved: 2024-08-06

## 2024-08-06 LAB
CRP SERPL-MCNC: <0.4 MG/DL (ref ?–0.5)
ERYTHROCYTE [SEDIMENTATION RATE] IN BLOOD: 95 MM/HR

## 2024-08-06 PROCEDURE — 3074F SYST BP LT 130 MM HG: CPT | Performed by: INTERNAL MEDICINE

## 2024-08-06 PROCEDURE — 86140 C-REACTIVE PROTEIN: CPT

## 2024-08-06 PROCEDURE — 3008F BODY MASS INDEX DOCD: CPT | Performed by: INTERNAL MEDICINE

## 2024-08-06 PROCEDURE — 36415 COLL VENOUS BLD VENIPUNCTURE: CPT

## 2024-08-06 PROCEDURE — 3078F DIAST BP <80 MM HG: CPT | Performed by: INTERNAL MEDICINE

## 2024-08-06 PROCEDURE — 99214 OFFICE O/P EST MOD 30 MIN: CPT | Performed by: INTERNAL MEDICINE

## 2024-08-06 PROCEDURE — 85652 RBC SED RATE AUTOMATED: CPT

## 2024-08-06 RX ORDER — ORAL SEMAGLUTIDE 7 MG/1
3 TABLET ORAL EVERY MORNING
COMMUNITY
Start: 2024-02-14

## 2024-08-06 NOTE — PATIENT INSTRUCTIONS
OSTEOARTHRITIS    Fast Facts    Though some of the joint changes are irreversible, most patients will not need joint replacement surgery.    OA symptoms (what you feel) can vary greatly among patients.    A rheumatologist can detect arthritis and prescribe the proper treatment. The goal of treatment in OA is to reduce pain and improve function.    Exercise is an important part of OA treatment, because it can decrease joint pain and improve function.    At present, there is no treatment that can reverse the damage of OA in the joints. Researchers are trying to find ways to slow or reverse this joint damage.    Osteoarthritis (also known as OA) is a common joint disease that most often affects middle-age to elderly people. It is commonly referred to as \"wear and tear\" of the joints, but we now know that OA is a disease of the entire joint, involving the cartilage, joint lining, ligaments, and bone. Although it is more common in older people, it is not really accurate to say that the joints are just \"wearing out.\" It is characterized by breakdown of the cartilage (the tissue that cushions the ends of the bones between joints), bony changes of the joints, deterioration of tendons and ligaments, and various degrees of inflammation of the joint lining (called the synovium).    This arthritis tends to occur in the hand joints, spine, hips, knees, and great toes. The lifetime risk of developing OA of the knee is about 46%, and the lifetime risk of developing OA of the hip is 25%, according to the [facility] Osteoarthritis Project, a long-term study from the [facility] and sponsored by the Centers for Disease Control and Prevention (often called the CDC) and the National Institutes of Health.    OA is a top cause of disability in older people. The goal of osteoarthritis treatment is to reduce pain and improve function. There is no cure for the disease, but some treatments attempt to slow disease  progression.         What is osteoarthritis?    OA is a frequently slowly progressive joint disease typically seen in middle-aged to elderly people. In osteoarthritis, the cartilage between the bones in the joint breaks down. This causes the affected bones to slowly get bigger. The joint cartilage often breaks down because of mechanical stress or biochemical changes within the body, causing the bone underneath to fail. OA can occur together with other types of arthritis, such as gout or rheumatoid arthritis.    OA tends to affect commonly used joints such as the hands and spine, and the weight-bearing joints such as the hips and knees. Symptoms include:    Joint pain and stiffness    Knobby swelling at the joint    Cracking or grinding noise with joint movement    Decreased function of the joint    How do you treat osteoarthritis?    There is no proven treatment yet that can reverse joint damage from OA. The goal of osteoarthritis treatment is to reduce pain and improve function of the affected joints. Most often, this is possible with a mixture of physical measures and drug therapy and, sometimes, surgery.    Physical measures: Weight loss and exercise are useful in OA. Excess weight puts stress on your knee joints and hips and low back. For every 10 pounds of weight you lose over 10 years, you can reduce the chance of developing knee OA by up to 50 percent. Exercise can improve your muscle strength, decrease joint pain and stiffness, and lower the chance of disability due to OA. Also helpful are support (\"assistive\") devices, such as orthotics or a walking cane, that help you do daily activities. Heat or cold therapy can help relieve OA symptoms for a short time.    Certain alternative treatments such as spa (hot tub), massage, and chiropractic manipulation can help relieve pain for a short time. They can be costly, though, and require repeated treatments. Also, the long-term benefits of these alternative  (sometimes called complementary or integrative) medicine treatments are unproven but are under study.    Drug therapy: Forms of drug therapy include topical, oral (by mouth) and injections (shots). You apply topical drugs directly on the skin over the affected joints. These medicines include capsaicin cream, lidocaine and diclofenac gel. Oral pain relievers such as acetaminophen are common first treatments. So are nonsteroidal anti-inflammatory drugs (often called NSAIDs), which decrease swelling and pain.    In 2010, the government (FDA) approved the use of duloxetine (Cymbalta) for chronic (long-term) musculoskeletal pain including from OA. This oral drug is not new. It also is in use for other health concerns, such as mood disorders, nerve pain and fibromyalgia.    Patients with more serious pain may need stronger medications, such as prescription narcotics.    Joint injections with corticosteroids (sometimes called cortisone shots) or with a form of lubricant called hyaluronic acid can give months of pain relief from OA. This lubricant is given in the knee, and these shots may help delay the need for a knee replacement by a few years in some patients.    Surgery: Surgical treatment becomes an option for severe cases. This includes when the joint has serious damage, or when medical treatment fails to relieve pain and you have major loss of function. Surgery may involve arthroscopy, repair of the joint done through small incisions (cuts). If the joint damage cannot be repaired, you may need a joint replacement.    Supplements: Many over-the-counter nutrition supplements have been used for osteoarthritis treatment. Most lack good research data to support their effectiveness and safety. Among the most widely used are calcium, vitamin D and omega-3 fatty acids. To ensure safety and avoid drug interactions, consult your doctor or pharmacist before using any of these supplements. This is especially true when you are  combining these supplements with prescribed

## 2024-08-06 NOTE — PROGRESS NOTES
[facility], [address] [place] [address]      Consult     Caitlin Ignacio Patient Status:  No patient class for patient encounter    1960 MRN [id number]   Location [facility], [address] [place] [address] Attending No att. providers found   Hosp Day # 0 PCP Angel Anton DO     Referring Provider: PCP    Reason for Consultation: Osteoarthritis elevated ESR    Subjective:    Caitlin Ignacio is a 64 year old female with further evaluation of elevated esr with last value of 2023 of 53;     Started having hip pain on the right about a year; achiness of right lower extremity     In the morning limps for a little bit and then turn and walk then improves; worsens with sitting; low buttock area and no weakness; painful to touch.  Does have groin pain when she externally rotates her hip.     [facility] hip [facility] at [facility] unknown results; an send to PT; was for leg; around a years and half ago; no urine or bowel issues or incontinence     Denies any pain in her shoulders, elbows , wrists or hands (at times right hand but more right hand) occasional at the tip where there is a nodule; no swelling      Has right knee pain (no swelling) ; no low back; +hip pain (on right)      Denies any major issues with her neck     Denies any history of DVT PE TIA CVA seizures migraines or headaches     States no shortness of breath ,chest pain ,fevers ,chills     States no urinary or bowel symptoms; bloody stools (mammogram normal; colonoscopy was normal in )      States no headaches jaw pain, vision changes      States no history of pericardial, pleural effusions     States no significant dry eyes or dry mouth (puts drops in eyes for glaucoma) stable; sees optho every 4 months (had pain in left eye; tobramycin/? Infection) (Dr. Phelan)      States no history of uveitis iritis scleritis     States no history of Raynaud's or digital ulcerations     States no major weight changes; night  sweats     Her weight has been stable     States no history of photosensitive or malar rash.     States no history of psoriasis     Denies any depression anxiety or insomnia       with alzheimers (takes care of him) some sleep issues from that     Was walking before this issue with right lower leg; last 4 weeks slowed down; walking is fine it hurts more when stiting      Last Hb 1 c was 7.3 (not on insulin) tried tramadol (some help)      Extensive autoimmune testing labs were unrevealing other than chronically elevated ESR CRP was normal    Back to disc bulging stenosis and arthritis MRI reviewed showing moderate to severe stenosis and disc bulging at multiple levels    She was given meloxicam but she was reluctant to take it.  She also declined gabapentin.  Instead she has lost about 10 to 15 pounds intentionally    Her pain has improved she never ended up going to pain management as we had recommended.  She states she will consider taking meloxicam as needed instead.  She is walking more and she has no concerns.  No swelling of her joints    She has no specific difficulty with movement otherwise.  No headaches no jaw pain no vision changes overall pain levels are minimal.  She states she is due for another colonoscopy shortly but is updated on mammogram and Pap smears    Patient was last seen February 2024    Had some bursitis as well as radiculitis placed on meloxicam and did go to physical therapy with improvement.     MRI of the lumbar spine showed multilevel disc disease and disc bulge and severe arthritis.  She never ended up going to pain management or getting injections.  She is only taking the meloxicam 6 times since last visit.    She states she is due for another colonoscopy shortly.  She is aware her sed rate is elevated at 91.  If it continues to be elevated consider CT abdomen pelvis chest to make sure there is no other etiology.    States no headaches jaw pain vision changes swelling of her  joints.  She has lost 30 pounds on weight loss injection therapy through her PCP and feels much better    Wt Readings from Last 6 Encounters:   08/06/24 213 lb (96.6 kg)   02/06/24 235 lb (106.6 kg)   10/30/23 243 lb (110.2 kg)     No further hip or back pain that is concerning    Her  has been placed in a memory care program in [facility] since it was getting too hard for her to take care of him at home with his dementia.  Her stressors have improved as well because of this.  She visits him on a regular basis and give some showers at the facility    History/Other:        Past Medical History:  Past Medical History:    Diabetes (HCC)        Past Surgical History:   Past Surgical History:   Procedure Laterality Date    Hernia repair  2011       Social History:  reports that she has never smoked. She has never used smokeless tobacco. She reports that she does not currently use alcohol. She reports that she does not use drugs.    Family History: History reviewed. No pertinent family history.    Allergies: No Known Allergies    Current Medications:  Current Outpatient Medications   Medication Sig Dispense Refill    RYBELSUS 7 MG Oral Tab Take 3 mg by mouth every morning.      tobramycin-dexamethasone 0.3-0.1 % Ophthalmic Suspension Place 1 drop into the right eye 4 (four) times daily.      Meloxicam 7.5 MG Oral Tab Take 1 tablet (7.5 mg total) by mouth 2 (two) times daily.      ONETOUCH ULTRA In Vitro Strip       latanoprost 0.005 % Ophthalmic Solution Place 1 drop into both eyes nightly.      lisinopril 2.5 MG Oral Tab Take 1 tablet (2.5 mg total) by mouth daily.      metFORMIN  MG Oral Tablet 24 Hr Take 2 tablets (1,000 mg total) by mouth 2 (two) times daily.        No current facility-administered medications for this visit.       (Not in a hospital admission)    Wt Readings from Last 6 Encounters:   08/06/24 213 lb (96.6 kg)   02/06/24 235 lb (106.6 kg)   10/30/23 243 lb (110.2 kg)        Review  of Systems:     Constitutional: Negative for chills, , fatigue, fever and unexpected weight change.    HENT: Negative for congestion, and mouth sores.    Eyes: Negative for photophobia, pain, redness and visual disturbance.    Respiratory: Negative for apnea, cough, chest tightness, shortness of breath, wheezing and stridor.    Cardiovascular: Negative for chest pain, palpitations and leg swelling.    Gastrointestinal: Negative for abdominal distention, abdominal pain, blood in stool, constipation, diarrhea and nausea.    Endocrine: Negative.     Genitourinary: Negative for decreased urine volume, difficulty urinating, dyspareunia, dysuria, flank pain, and frequency.    Musculoskeletal: Occasional arthralgias, no gait problem and joint swelling.    Skin: Negative for color change, pallor and rash. No raynauds or digital ulcerations no sclerodactly.    Allergic/Immunologic: Negative.    Neurological: Negative for dizziness, tremors, seizures, syncope, speech difficulty, weakness, light-headedness, numbness and headaches.    Hematological: Does not bruise/bleed easily.    Psychiatric/Behavioral: Negative for confusion, decreased concentration, hallucinations, self-injury, sleep disturbance and suicidal ideas or depression.    Objective:   [unfilled]  Vitals:    08/06/24 1012   BP: 124/68   Pulse: 101   Resp: 16   Temp: 98.2 °F (36.8 °C)          Constitutional: is oriented to person, place, and time. Appears well-developed and well-nourished. No distress.    HEENT: Normocephalic; EOMI; no jvd; no LAD; no oral or nasal ulcers.     Eyes: Conjunctivae and EOM are normal. Pupils are equal, round, and reactive to light.     Neck: Normal range of motion. No thyromegaly present.    Cardiovascular: RRR, no murmurs.    Lungs: Clear, Bilateral air entry, no wheezes.    Abdominal: Soft.    Musculoskeletal:    There is currently no information documented on the homunculus. Go to the Rheumatology activity and complete the  [facility] joint exam.     Joint Exam 08/06/2024     No joint exam has been documented for this visit        Swollen: -- 0    Tender: -- 0        Right shoulder: Exhibits normal range of motion on abduction and internal rotation, no tenderness, no bony tenderness, no deformity, no laceration, no pain and no spasm.        Left shoulder: Exhibits normal range of motion on abduction and internal and external rotation.  no tenderness, no bony tenderness, no swelling, no effusion, no deformity, no pain, no spasm and normal strength.        Right elbow:  Exhibits normal range of motion, no swelling, no effusion and no deformity. No tenderness found. No medial epicondyle, no lateral epicondyle and no olecranon process tenderness noted. There are no contractures or tophi or nodules.        Left elbow:  Normal range of motion, no swelling, no effusion and no deformity. No medial epicondyle, no lateral epicondyle and no olecranon process tenderness noted. There are no contractures or tophi or nodules.        Right wrist:  Exhibits normal range of motion, no tenderness, no bony tenderness, no swelling, no effusion and no crepitus. Flexion and extension intact w/o limitation.        Left wrist: Exhibits normal range of motion, no tenderness, no bony tenderness, no swelling, no effusion, no crepitus and no deformity. Flexion and extension intact without limitation.        Right hip: Exhibits normal range of motion, normal strength, no tenderness, no bony tenderness, no swelling and no crepitus.        Right hand: No synovitis of MCP,PIP or DIP joints; no Bouchards few scattered Heberden nodules noted;  strength: 100%.  Mild squaring first CMC joint        Left hand: No synovitis of MCP,PIP or DIP joints; no Bouchards few scattered Heberden nodules noted;  strength: 100%.  Mild squaring first CMC joint        Left hip: Exhibits normal range of motion, normal strength, no tenderness, no bony tenderness, No swelling and no  crepitus.        Right knee: Exhibits normal range of motion, no swelling, no effusion, no ecchymosis, no deformity and no erythema. No tenderness found. No medial joint line, no lateral joint line, no MCL and no LCL tenderness noted. mild crepitation on flexion of knee and extension normal.        Left knee:  Exhibits normal range of motion, no swelling, no effusion, no ecchymosis and no erythema. No tenderness found. No medial joint line, no lateral joint line and no patellar tendon tenderness noted. mild crepitation on flexion of the knee. Extension intact and normal.        Right ankle: No swelling, no deformity. No tenderness. Dorsiflexion and plantar flexion intact without limitation in range of motion.        Left ankle: Exhibits no swelling. No tenderness. No lateral malleolus and no medial malleolus tenderness found. Achilles tendon normal. Achilles tendon exhibits no pain, no defect and normal Tello's test results.  Dorsiflexion and plantar flexion intact without limitation in range of motion.        Cervical back: Exhibits normal range of motion, no tenderness, no bony tenderness, no swelling, no pain and no spasm.        Thoracic back: Exhibits normal range of motion, no tenderness, no bony tenderness and no spasm.        Lumbar back:  Exhibits normal range of motion, no tenderness, no bony tenderness, no pain and no spasm.        Right foot: normal. There is normal range of motion, no tenderness, no bony tenderness, no crepitus and no laceration. There is no synovitis or tenderness of the MTP joints to palpation.  Bony enlargement MTP joint        Left foot: normal. There is normal range of motion, no tenderness, no bony tenderness and no crepitus. There is no synovitis or tenderness of the MTP joints to palpation.  Bony enlargement MTP joints    Lymphadenopathy: No submental, no submandibular, and no occipital adenopathy present, has no cervical adenopathy or axillary  lympadenopathy.    Neurological: Alert and oriented. No focal motor or sensory abnormalities. Strength is 5/5 Upper Extremities/Lower Extremities proximally and distally.    Skin: Skin is warm, dry and intact.    Psychiatric: Normal behavior.    Results:    Labs:      Lab Results   Component Value Date    WBC 6.2 02/06/2024    RBC 4.46 02/06/2024    HGB 13.1 02/06/2024    HCT 40.9 02/06/2024    MCV 91.7 02/06/2024    MCH 29.4 02/06/2024    MCHC 32.0 02/06/2024    RDW 13.0 02/06/2024    .0 02/06/2024       No components found for: \"RELY\", \"NMET\", \"MYEL\", \"PROMY\", \"KIAN\", \"ABSNEUTS\", \"ABSBANDS\", \"ABMM\", \"ABMY\", \"ABPM\", \"ABBL\"      Lab Results   Component Value Date     02/06/2024    K 4.0 02/06/2024    CO2 27.0 02/06/2024    BUN 11 02/06/2024    ALB 3.9 02/06/2024    AST 23 02/06/2024    ALT 24 02/06/2024          No components found for: \"ESRWESTERGRN\"       Lab Results   Component Value Date    CRP <0.29 02/06/2024         No results found for: \"COLOR\", \"CLARITY\", \"UROBILINOGEN\", \"YEAST\"  @LABRCNTIP(RF,B12)@      [unfilled]    Imaging:  No results found.    Assessment & Plan:      64-year-old woman comes in for further evaluation for on and off right hip pain for the last year with signs of right lower extremity neuropathy and radiculopathy     Moderate to severe lumbar stenosis and disc bulges lumbar spine  Osteoarthritis multiple joints  Elevated ESR likely multifactorial no clear history suggestive of PMR or GCA  History of trochanteric bursitis      Reviewed x-ray and lumbar spine MRI.  Patient declined gabapentin pain medications or taking anti-inflammatories regularly  He states pain levels have improved  Offered gabapentin but patient would like to hold on this.  She would like to avoid steroids because of diabetes type 2.  Her last hemoglobin A1c was 7.3  Suspect neuropathic pain    Continue meloxicam 7.5 mg twice a day as needed for arthritic pain/bursitis and likely lumbar radiculitis.  Risk  of NSAIDs discussed.    Recent labs CBC BMP noted to be normal May 2024    Extensive autoimmune workup normal and reviewed from February 2024    If she stays on this long-term we will consider ranitidine    States she will consider this again    She is doing exercises on her own is not interested in referral to pain management or any other interventions or medications at this time    No history suggestive of PMR or GCA    Age-appropriate malignancy screening through PCP    We will monitor for evolving connective tissue disease     Would like to obtain last DEXA scan per patient done at [facility] along with her other additional labs.    Repeat ESR CRP if ESR still elevated may need to consider CT abdomen pelvis chest to rule out other etiology of elevated ESR CRP and make sure patient has age-appropriate malignancy screening through PCP including updated colonoscopy    Education and counseling provided to patient.  Instructed patient to call my office or seek medical attention immediately if symptoms worsen. Risks and side effects of medications and diagnosis discussed in detail and patient was given written information on new prescribed medications.    Return to clinic:  Return in about 6 months (around 2/6/2025).    Isa Donis MD  2/6/2024

## 2024-08-08 ENCOUNTER — TELEPHONE (OUTPATIENT)
Dept: RHEUMATOLOGY | Facility: CLINIC | Age: 64
End: 2024-08-08

## 2024-08-08 DIAGNOSIS — R70.0 ELEVATED SED RATE: Primary | ICD-10-CM

## 2024-08-08 NOTE — TELEPHONE ENCOUNTER
Component  Ref Range & Units 2 d ago 6 mo ago 9 mo ago   Sed Rate  0 - 30 mm/Hr 95 High  91 High  75 High    Resulting Agency [facility] ([facility]) [facility] ([facility]) [facility]          ESR is chronically elevated and going higher when patients have high ESR with no clear etiology I do recommend considering a CT abdomen pelvis and chest to make sure there is nothing internally causing the elevated levels.    Let me know if she would like to proceed.  If we can get insurance to cover it I will order it

## 2024-08-12 NOTE — TELEPHONE ENCOUNTER
Spoke with pt. regarding results and recommendation per Dr. George. Pt. verbalizes understanding and agrees with plan.           Component  Ref Range & Units 2 d ago 6 mo ago 9 mo ago   Sed Rate  0 - 30 mm/Hr 95 High  91 High  75 High    Resulting Agency [facility] ([facility]) [facility]          ESR is chronically elevated and going higher when patients have high ESR with no clear etiology I do recommend considering a CT abdomen pelvis and chest to make sure there is nothing internally causing the elevated levels.    Let me know if she would like to proceed.  If we can get insurance to cover it I will order it      Pt. Would like to proceed. Is the CT with or without oral contrast? Once the order is placed pt. can call central scheduling and make appointment. Once appointment is scheduled the referral department will start prior authorization.

## 2024-08-13 ENCOUNTER — TELEPHONE (OUTPATIENT)
Dept: RHEUMATOLOGY | Facility: CLINIC | Age: 64
End: 2024-08-13

## 2024-08-14 ENCOUNTER — TELEPHONE (OUTPATIENT)
Dept: RHEUMATOLOGY | Facility: CLINIC | Age: 64
End: 2024-08-14

## 2024-08-14 NOTE — TELEPHONE ENCOUNTER
Left VM to call office if any further questions. CT scan scheduled on 8/15/24 at   [address].     Addressed in another TE

## 2024-08-15 ENCOUNTER — TELEPHONE (OUTPATIENT)
Dept: RHEUMATOLOGY | Facility: CLINIC | Age: 64
End: 2024-08-15

## 2024-08-15 ENCOUNTER — HOSPITAL ENCOUNTER (OUTPATIENT)
Dept: CT IMAGING | Age: 64
Discharge: HOME | End: 2024-08-15
Attending: INTERNAL MEDICINE
Payer: MEDICARE

## 2024-08-15 DIAGNOSIS — R70.0 ELEVATED SED RATE: ICD-10-CM

## 2024-08-15 PROCEDURE — 71250 CT THORAX DX C-: CPT | Performed by: INTERNAL MEDICINE

## 2024-08-15 PROCEDURE — 74176 CT ABD & PELVIS W/O CONTRAST: CPT | Performed by: INTERNAL MEDICINE

## 2024-08-15 NOTE — TELEPHONE ENCOUNTER
Patient states that she had the CT scan done today and would like the results of the scan. Results are in the chart. Please advise

## 2024-08-15 NOTE — TELEPHONE ENCOUNTER
mpression  CONCLUSION:    1. Small focus of ground-glass density inferior lingula is noted.  This could represent atypical or viral pneumonia and is nonspecific.  2. There is cholelithiasis without CT evidence of cholecystitis.  3. Ventral abdominal hernia containing nondistended small bowel.  4. There is diverticulosis of the colon without CT evidence of diverticulitis.                LOCATION:  [facility]    CT abdomen pelvis chest no acute findings other than incidental findings of a small density in the lingula possible differential is atypical viral changes or nonspecific    Also diverticulosis of the colon    There is a incidental ventral abdominal hernia containing distended small bowel    I will pass this along to patient's PCP    We will continue to monitor every 6 months

## 2024-08-19 NOTE — TELEPHONE ENCOUNTER
Phoned pt, notified of test results per Dr. Donis     \"CT abdomen pelvis chest no acute findings other than incidental findings of a small density in the lingula possible differential is atypical viral changes or nonspecific     Also diverticulosis of the colon     There is a incidental ventral abdominal hernia containing distended small bowel     I will pass this along to patient's PCP     We will continue to monitor every 6 months\"    Pt asking appropriate questions and voices understanding

## 2025-02-17 ENCOUNTER — OFFICE VISIT (OUTPATIENT)
Dept: RHEUMATOLOGY | Facility: CLINIC | Age: 65
End: 2025-02-17
Payer: MEDICARE

## 2025-02-17 ENCOUNTER — LAB ENCOUNTER (OUTPATIENT)
Dept: LAB | Age: 65
End: 2025-02-17
Attending: INTERNAL MEDICINE
Payer: MEDICARE

## 2025-02-17 VITALS
WEIGHT: 209 LBS | RESPIRATION RATE: 16 BRPM | SYSTOLIC BLOOD PRESSURE: 128 MMHG | TEMPERATURE: 98 F | HEIGHT: 68 IN | HEART RATE: 85 BPM | BODY MASS INDEX: 31.67 KG/M2 | OXYGEN SATURATION: 99 % | DIASTOLIC BLOOD PRESSURE: 70 MMHG

## 2025-02-17 DIAGNOSIS — R70.0 SEDIMENTATION RATE ELEVATION: ICD-10-CM

## 2025-02-17 DIAGNOSIS — E11.9 TYPE 2 DIABETES MELLITUS WITHOUT COMPLICATION, WITHOUT LONG-TERM CURRENT USE OF INSULIN (HCC): ICD-10-CM

## 2025-02-17 DIAGNOSIS — L40.9 PSORIASIS: ICD-10-CM

## 2025-02-17 DIAGNOSIS — R70.0 SEDIMENTATION RATE ELEVATION: Primary | ICD-10-CM

## 2025-02-17 DIAGNOSIS — Z79.1 ENCOUNTER FOR LONG-TERM (CURRENT) USE OF NSAIDS: ICD-10-CM

## 2025-02-17 DIAGNOSIS — M15.0 PRIMARY OSTEOARTHRITIS INVOLVING MULTIPLE JOINTS: ICD-10-CM

## 2025-02-17 DIAGNOSIS — M48.061 SPINAL STENOSIS OF LUMBAR REGION WITHOUT NEUROGENIC CLAUDICATION: ICD-10-CM

## 2025-02-17 LAB
ALBUMIN SERPL-MCNC: 5.1 G/DL (ref 3.2–4.8)
ALBUMIN/GLOB SERPL: 1.8 {RATIO} (ref 1–2)
ALP LIVER SERPL-CCNC: 96 U/L
ALT SERPL-CCNC: 12 U/L
ANION GAP SERPL CALC-SCNC: 12 MMOL/L (ref 0–18)
AST SERPL-CCNC: 19 U/L (ref ?–34)
BASOPHILS # BLD AUTO: 0.01 X10(3) UL (ref 0–0.2)
BASOPHILS NFR BLD AUTO: 0.3 %
BILIRUB SERPL-MCNC: 0.5 MG/DL (ref 0.2–1.1)
BUN BLD-MCNC: 7 MG/DL (ref 9–23)
CALCIUM BLD-MCNC: 10.5 MG/DL (ref 8.7–10.6)
CHLORIDE SERPL-SCNC: 103 MMOL/L (ref 98–112)
CO2 SERPL-SCNC: 26 MMOL/L (ref 21–32)
CREAT BLD-MCNC: 0.77 MG/DL
CRP SERPL-MCNC: <0.4 MG/DL (ref ?–0.5)
EGFRCR SERPLBLD CKD-EPI 2021: 86 ML/MIN/1.73M2 (ref 60–?)
EOSINOPHIL # BLD AUTO: 0.07 X10(3) UL (ref 0–0.7)
EOSINOPHIL NFR BLD AUTO: 1.8 %
ERYTHROCYTE [DISTWIDTH] IN BLOOD BY AUTOMATED COUNT: 13.2 %
ERYTHROCYTE [SEDIMENTATION RATE] IN BLOOD: 98 MM/HR
FASTING STATUS PATIENT QL REPORTED: YES
GLOBULIN PLAS-MCNC: 2.9 G/DL (ref 2–3.5)
GLUCOSE BLD-MCNC: 83 MG/DL (ref 70–99)
HCT VFR BLD AUTO: 37.6 %
HGB BLD-MCNC: 12.5 G/DL
IGA SERPL-MCNC: 344.6 MG/DL (ref 40–350)
IGM SERPL-MCNC: 58.8 MG/DL (ref 50–300)
IMM GRANULOCYTES # BLD AUTO: 0.01 X10(3) UL (ref 0–1)
IMM GRANULOCYTES NFR BLD: 0.3 %
IMMUNOGLOBULIN PNL SER-MCNC: 1300 MG/DL (ref 650–1600)
LYMPHOCYTES # BLD AUTO: 1.45 X10(3) UL (ref 1–4)
LYMPHOCYTES NFR BLD AUTO: 37.4 %
MCH RBC QN AUTO: 30.1 PG (ref 26–34)
MCHC RBC AUTO-ENTMCNC: 33.2 G/DL (ref 31–37)
MCV RBC AUTO: 90.6 FL
MONOCYTES # BLD AUTO: 0.45 X10(3) UL (ref 0.1–1)
MONOCYTES NFR BLD AUTO: 11.6 %
NEUTROPHILS # BLD AUTO: 1.89 X10 (3) UL (ref 1.5–7.7)
NEUTROPHILS # BLD AUTO: 1.89 X10(3) UL (ref 1.5–7.7)
NEUTROPHILS NFR BLD AUTO: 48.6 %
OSMOLALITY SERPL CALC.SUM OF ELEC: 289 MOSM/KG (ref 275–295)
PLATELET # BLD AUTO: 211 10(3)UL (ref 150–450)
POTASSIUM SERPL-SCNC: 4.3 MMOL/L (ref 3.5–5.1)
PROT SERPL-MCNC: 8 G/DL (ref 5.7–8.2)
RBC # BLD AUTO: 4.15 X10(6)UL
SODIUM SERPL-SCNC: 141 MMOL/L (ref 136–145)
VIT D+METAB SERPL-MCNC: 28.3 NG/ML (ref 30–100)
WBC # BLD AUTO: 3.9 X10(3) UL (ref 4–11)

## 2025-02-17 PROCEDURE — 85652 RBC SED RATE AUTOMATED: CPT

## 2025-02-17 PROCEDURE — 86334 IMMUNOFIX E-PHORESIS SERUM: CPT

## 2025-02-17 PROCEDURE — 83521 IG LIGHT CHAINS FREE EACH: CPT

## 2025-02-17 PROCEDURE — 84165 PROTEIN E-PHORESIS SERUM: CPT

## 2025-02-17 PROCEDURE — 85025 COMPLETE CBC W/AUTO DIFF WBC: CPT

## 2025-02-17 PROCEDURE — 82306 VITAMIN D 25 HYDROXY: CPT

## 2025-02-17 PROCEDURE — 82784 ASSAY IGA/IGD/IGG/IGM EACH: CPT

## 2025-02-17 PROCEDURE — 80053 COMPREHEN METABOLIC PANEL: CPT

## 2025-02-17 PROCEDURE — 36415 COLL VENOUS BLD VENIPUNCTURE: CPT

## 2025-02-17 PROCEDURE — 86140 C-REACTIVE PROTEIN: CPT

## 2025-02-17 NOTE — PROGRESS NOTES
[facility], [address] [place] [address]      Consult     Caitlin Cardenas Patient Status:  No patient class for patient encounter    1960 MRN [id number]   Location [facility], [address] [place] [address] Attending No att. providers found   Hosp Day # 0 PCP Angel Anton DO     Referring Provider: PCP    Reason for Consultation: Osteoarthritis elevated ESR    Subjective:    Caitlin Cardenas is a 64 year old female with further evaluation of elevated esr with last value of 2023 of 53;     Started having hip pain on the right about a year; achiness of right lower extremity     In the morning limps for a little bit and then turn and walk then improves; worsens with sitting; low buttock area and no weakness; painful to touch.  Does have groin pain when she externally rotates her hip.     [facility] unknown results; an send to PT; was for leg; around a years and half ago; no urine or bowel issues or incontinence     Denies any pain in her shoulders, elbows , wrists or hands (at times right hand but more right hand) occasional at the tip where there is a nodule; no swelling      Has right knee pain (no swelling) ; no low back; +hip pain (on right)      Denies any major issues with her neck     Denies any history of DVT PE TIA CVA seizures migraines or headaches     States no shortness of breath ,chest pain ,fevers ,chills     States no urinary or bowel symptoms; bloody stools (mammogram normal; colonoscopy was normal in )      States no headaches jaw pain, vision changes      States no history of pericardial, pleural effusions     States no significant dry eyes or dry mouth (puts drops in eyes for glaucoma) stable; sees optho every 4 months (had pain in left eye; tobramycin/? Infection) (Dr. Phelan)      States no history of uveitis iritis scleritis     States no history of Raynaud's or digital ulcerations     States no major weight changes; night  sweats     Her weight has been stable     States no history of photosensitive or malar rash.     States no history of psoriasis     Denies any depression anxiety or insomnia       with alzheimers (takes care of him) some sleep issues from that     Was walking before this issue with right lower leg; last 4 weeks slowed down; walking is fine it hurts more when stiting      Last Hb 1 c was 7.3 (not on insulin) tried tramadol (some help)      Extensive autoimmune testing labs were unrevealing other than chronically elevated ESR CRP was normal    Back to disc bulging stenosis and arthritis MRI reviewed showing moderate to severe stenosis and disc bulging at multiple levels    She was given meloxicam but she was reluctant to take it.  She also declined gabapentin.  Instead she has lost about 10 to 15 pounds intentionally    Her pain has improved she never ended up going to pain management as we had recommended.  She states she will consider taking meloxicam as needed instead.  She is walking more and she has no concerns.  No swelling of her joints    She has no specific difficulty with movement otherwise.  No headaches no jaw pain no vision changes overall pain levels are minimal.  She states she is due for another colonoscopy shortly but is updated on mammogram and Pap smears    Patient was last seen February 2024    Had some bursitis as well as radiculitis placed on meloxicam and did go to physical therapy with improvement.     MRI of the lumbar spine showed multilevel disc disease and disc bulge and severe arthritis.  She never ended up going to pain management or getting injections.  She is only taking the meloxicam 6 times since last visit.    She states she is due for another colonoscopy shortly.      She was seen again August 2024    ESR was 95 CT abdomen pelvis was done which showed no acute findings other than incidental diverticulosis and gallstones without cholecystitis    She has noticed increased  dryness of her scalp and flakiness.  We had discussed considering seeing dermatology to see if she has psoriasis of the scalp which could explain her elevated ESR    She states no swelling of her joints or major joint concerns    Denies any headaches jaw pain vision changes.  Intentional weight loss watching her diet    No further hip or back pain that is concerning    Her  has been placed in a memory care program in [facility] since it was getting too hard for her to take care of him at home with his dementia.  Her stressors have improved as well because of this.  She visits him on a regular basis and give some showers at the facility    Accompanied by her 5-year-old grandson today that she is watching because he is off for presidents day    History/Other:        Past Medical History:  Past Medical History:    Diabetes (HCC)        Past Surgical History:   Past Surgical History:   Procedure Laterality Date    Hernia repair  2011       Social History:  reports that she has never smoked. She has never used smokeless tobacco. She reports that she does not currently use alcohol. She reports that she does not use drugs.    Family History: History reviewed. No pertinent family history.    Allergies: No Known Allergies    Current Medications:  Current Outpatient Medications   Medication Sig Dispense Refill    RYBELSUS 7 MG Oral Tab Take 7 mg by mouth every morning.      Meloxicam 7.5 MG Oral Tab Take 1 tablet (7.5 mg total) by mouth 2 (two) times daily. As needed      ONETOUCH ULTRA In Vitro Strip       latanoprost 0.005 % Ophthalmic Solution Place 1 drop into both eyes nightly.      lisinopril 2.5 MG Oral Tab Take 1 tablet (2.5 mg total) by mouth daily.      metFORMIN  MG Oral Tablet 24 Hr Take 2 tablets (1,000 mg total) by mouth 2 (two) times daily.        No current facility-administered medications for this visit.       (Not in a hospital admission)    Wt Readings from Last 6 Encounters:   02/17/25  209 lb (94.8 kg)   08/06/24 213 lb (96.6 kg)   02/06/24 235 lb (106.6 kg)   10/30/23 243 lb (110.2 kg)        Review of Systems:     Constitutional: Negative for chills, , fatigue, fever and unexpected weight change.    HENT: Negative for congestion, and mouth sores.    Eyes: Negative for photophobia, pain, redness and visual disturbance.    Respiratory: Negative for apnea, cough, chest tightness, shortness of breath, wheezing and stridor.    Cardiovascular: Negative for chest pain, palpitations and leg swelling.    Gastrointestinal: Negative for abdominal distention, abdominal pain, blood in stool, constipation, diarrhea and nausea.    Endocrine: Negative.     Genitourinary: Negative for decreased urine volume, difficulty urinating, dyspareunia, dysuria, flank pain, and frequency.    Musculoskeletal: Occasional arthralgias, no gait problem and joint swelling.    Skin: Negative for color change, pallor and rash. No raynauds or digital ulcerations no sclerodactly.    Allergic/Immunologic: Negative.    Neurological: Negative for dizziness, tremors, seizures, syncope, speech difficulty, weakness, light-headedness, numbness and headaches.    Hematological: Does not bruise/bleed easily.    Psychiatric/Behavioral: Negative for confusion, decreased concentration, hallucinations, self-injury, sleep disturbance and suicidal ideas or depression.    Objective:   [unfilled]  Vitals:    02/17/25 1050   BP: 128/70   Pulse: 85   Resp: 16   Temp: 97.8 °F (36.6 °C)          Constitutional: is oriented to person, place, and time. Appears well-developed and well-nourished. No distress.    HEENT: Normocephalic; EOMI; no jvd; no LAD; no oral or nasal ulcers.     Eyes: Conjunctivae and EOM are normal. Pupils are equal, round, and reactive to light.     Neck: Normal range of motion. No thyromegaly present.    Cardiovascular: RRR, no murmurs.    Lungs: Clear, Bilateral air entry, no wheezes.    Abdominal:  Soft.    Musculoskeletal:    There is currently no information documented on the [facility]. Go to the Rheumatology activity and complete the [facility] joint exam.     Joint Exam 02/17/2025     No joint exam has been documented for this visit        Swollen: -- 0    Tender: -- 0        Right shoulder: Exhibits normal range of motion on abduction and internal rotation, no tenderness, no bony tenderness, no deformity, no laceration, no pain and no spasm.        Left shoulder: Exhibits normal range of motion on abduction and internal and external rotation.  no tenderness, no bony tenderness, no swelling, no effusion, no deformity, no pain, no spasm and normal strength.        Right elbow:  Exhibits normal range of motion, no swelling, no effusion and no deformity. No tenderness found. No medial epicondyle, no lateral epicondyle and no olecranon process tenderness noted. There are no contractures or tophi or nodules.        Left elbow:  Normal range of motion, no swelling, no effusion and no deformity. No medial epicondyle, no lateral epicondyle and no olecranon process tenderness noted. There are no contractures or tophi or nodules.        Right wrist:  Exhibits normal range of motion, no tenderness, no bony tenderness, no swelling, no effusion and no crepitus. Flexion and extension intact w/o limitation.        Left wrist: Exhibits normal range of motion, no tenderness, no bony tenderness, no swelling, no effusion, no crepitus and no deformity. Flexion and extension intact without limitation.        Right hip: Exhibits normal range of motion, normal strength, no tenderness, no bony tenderness, no swelling and no crepitus.        Right hand: No synovitis of MCP,PIP or DIP joints; no Bouchards few scattered Heberden nodules noted;  strength: 100%.  Mild squaring first CMC joint        Left hand: No synovitis of MCP,PIP or DIP joints; no Bouchards few scattered Heberden nodules noted;  strength: 100%.  Mild  squaring first CMC joint        Left hip: Exhibits normal range of motion, normal strength, no tenderness, no bony tenderness, No swelling and no crepitus.        Right knee: Exhibits normal range of motion, no swelling, no effusion, no ecchymosis, no deformity and no erythema. No tenderness found. No medial joint line, no lateral joint line, no MCL and no LCL tenderness noted. mild crepitation on flexion of knee and extension normal.        Left knee:  Exhibits normal range of motion, no swelling, no effusion, no ecchymosis and no erythema. No tenderness found. No medial joint line, no lateral joint line and no patellar tendon tenderness noted. mild crepitation on flexion of the knee. Extension intact and normal.        Right ankle: No swelling, no deformity. No tenderness. Dorsiflexion and plantar flexion intact without limitation in range of motion.        Left ankle: Exhibits no swelling. No tenderness. No lateral malleolus and no medial malleolus tenderness found. Achilles tendon normal. Achilles tendon exhibits no pain, no defect and normal Tello's test results.  Dorsiflexion and plantar flexion intact without limitation in range of motion.        Cervical back: Exhibits normal range of motion, no tenderness, no bony tenderness, no swelling, no pain and no spasm.        Thoracic back: Exhibits normal range of motion, no tenderness, no bony tenderness and no spasm.        Lumbar back:  Exhibits normal range of motion, no tenderness, no bony tenderness, no pain and no spasm.        Right foot: normal. There is normal range of motion, no tenderness, no bony tenderness, no crepitus and no laceration. There is no synovitis or tenderness of the MTP joints to palpation.  Bony enlargement MTP joint        Left foot: normal. There is normal range of motion, no tenderness, no bony tenderness and no crepitus. There is no synovitis or tenderness of the MTP joints to palpation.  Bony enlargement MTP  joints    Lymphadenopathy: No submental, no submandibular, and no occipital adenopathy present, has no cervical adenopathy or axillary lympadenopathy.    Neurological: Alert and oriented. No focal motor or sensory abnormalities. Strength is 5/5 Upper Extremities/Lower Extremities proximally and distally.    Skin: Skin is warm, dry and intact.    Psychiatric: Normal behavior.    Results:    Labs:      Lab Results   Component Value Date    WBC 6.2 02/06/2024    RBC 4.46 02/06/2024    HGB 13.1 02/06/2024    HCT 40.9 02/06/2024    MCV 91.7 02/06/2024    MCH 29.4 02/06/2024    MCHC 32.0 02/06/2024    RDW 13.0 02/06/2024    .0 02/06/2024       No components found for: \"RELY\", \"NMET\", \"MYEL\", \"PROMY\", \"KIAN\", \"ABSNEUTS\", \"ABSBANDS\", \"ABMM\", \"ABMY\", \"ABPM\", \"ABBL\"      Lab Results   Component Value Date     02/06/2024    K 4.0 02/06/2024    CO2 27.0 02/06/2024    BUN 11 02/06/2024    ALB 3.9 02/06/2024    AST 23 02/06/2024    ALT 24 02/06/2024          No components found for: \"ESRWESTERGRN\"       Lab Results   Component Value Date    CRP <0.40 08/06/2024         No results found for: \"COLOR\", \"CLARITY\", \"UROBILINOGEN\", \"YEAST\"  @LABRCNTIP(RF,B12)@      [unfilled]    Imaging:  No results found.    Narrative   PROCEDURE:  CT CHEST+ABDOMEN+PELVIS(CPT=71250/59814)     COMPARISON:  None.     INDICATIONS:  R70.0 Elevated sed rate     TECHNIQUE:  Following oral contrast administration, unenhanced multislice CT scanning is performed through the chest, abdomen, and pelvis.  Dose reduction techniques were used. Dose information is transmitted to the ACR (American College of Radiology)  NRDR (National Radiology Data Registry) which includes the Dose Index Registry.     PATIENT STATED HISTORY: (As transcribed by Technologist)  Patient had an elevated Sed rate and possible failed mesh from a prior abdominal hernia.         FINDINGS:       CHEST:    LUNGS:  Wedge-shaped focus of ground-glass density inferior lingula series  305 image 64 could represent a small focus of pneumonia but is nonspecific.  There is no evidence of significant interstitial lung disease.  MEDIASTINUM:  No mass or adenopathy.    JELENA:  No mass or adenopathy.    CARDIAC:  No enlargement, pericardial thickening, or significant coronary artery calcification.  PLEURA:  No mass or effusion.    CHEST WALL:  No mass or axillary adenopathy.    AORTA:  No aneurysm or dissection.    VASCULATURE:  Pulmonary vessels are unremarkable within the limits of a noncontrast CT.       ABDOMEN/PELVIS:  LIVER:  No enlargement, atrophy, abnormal density, or significant focal lesion.    BILIARY:  There is cholelithiasis.  There is no evidence of cholecystitis.  PANCREAS:  No lesion, fluid collection, ductal dilatation, or atrophy.    SPLEEN:  No enlargement or focal lesion.    KIDNEYS:  No mass, obstruction, or calcification.    ADRENALS:  Mild nodular thickening anterior limb right adrenal gland is noted.  This is most likely benign adenomatous hyperplasia and does not necessarily require additional evaluation.  AORTA:  No aneurysm or dissection.    RETROPERITONEUM:  No mass or adenopathy.    BOWEL/MESENTERY:  There is diverticulosis of the colon.  There is no CT evidence of diverticulitis.  ABDOMINAL WALL:  There is wide necked midline abdominal wall hernia containing nondistended small bowel.  URINARY BLADDER:  No visible focal wall thickening, lesion, or calculus.    PELVIC NODES:  No adenopathy.    PELVIC ORGANS:  No visible mass.  Pelvic organs appropriate for patient age.    BONES:  There are degenerative changes in the lumbar spine.                   Impression   CONCLUSION:    1. Small focus of ground-glass density inferior lingula is noted.  This could represent atypical or viral pneumonia and is nonspecific.  2. There is cholelithiasis without CT evidence of cholecystitis.  3. Ventral abdominal hernia containing nondistended small bowel.  4. There is diverticulosis of the  colon without CT evidence of diverticulitis.             Narrative   PROCEDURE:  MRI SPINE LUMBAR (CPT=72148)     COMPARISON:  [facility], XR, XR LUMBAR SPINE (MIN 4 VIEWS) (CPT=72110), 10/30/2023, 12:44 PM.     INDICATIONS:  Patient presents with low back pain and left-sided sciatica symptoms.     TECHNIQUE:  Multiplanar T1 and T2 weighted images including fat suppression sequences.  Images acquired in sagittal and axial planes.       PATIENT STATED HISTORY: (As transcribed by Technologist)  Patient complains of lower back pain with left sided sciatica.         FINDINGS:    LUMBAR DISC LEVELS  L1-L2:  No significant disc disease.  Mild bilateral posterior articular facet joint osteoarthritis noted.  No evidence of stenosis.  L2-L3:  There is mild disc desiccation and minimal annular disc bulge.  There is mild bilateral facet arthropathy and ligamentum flavum thickening.  No evidence of stenosis.  L3-L4:  There is mild disc desiccation.  There is mild bilateral facet arthropathy with ligamentum flavum thickening.  No significant stenosis.  L4-L5:  Mild disc desiccation and mild annular disc bulge noted.  There is severe bilateral facet arthropathy with hypertrophy and ligamentum flavum thickening.  There is a synovial cyst projecting anteromedially into the central canal from the  left-sided articular facets that measures approximately 1.5 x 1.0 x 0.8 cm in craniocaudal, AP and transverse dimensions respectively.  There is secondary moderate central canal stenosis.  No significant subarticular or neural foraminal stenosis noted.  L5-S1:  Mild disc desiccation, mild disc height loss and mild annular disc bulge.  Mild to moderate bilateral facet arthropathy.  No significant stenosis.     PARASPINAL AREA:  Normal with no visible mass.    BONY STRUCTURES:  Normal alignment of the lumbar spine.  No acute osseous injuries are noted.  No lytic, blastic or destructive osseous changes are identified.  There is multilevel  facet osteoarthritis involving the posterior articular facets, most  pronounced at L4-5 as described above.  CORD/CAUDA EQUINA:  Normal caliber, contour, and signal intensity.                     Impression   CONCLUSION:    1. There is multilevel disc disease and facet arthropathy, most pronounced at L4-5 where there is mild desiccation, annular disc bulge and severe bilateral facet arthropathy.  There is an associated synovial cyst projecting anteromedially from the  left-sided facets into the central canal that measures approximately 1.5 cm in maximal dimension.  There is secondary moderate central canal stenosis, likely accounting for the patient's symptoms.  2. No additional regions of significant stenosis within the lumbar spine identified.  Please see the body of the report above for further, specific details regarding each individual lumbar level.        LOCATION:  [facility]          Assessment & Plan:      64-year-old woman chronic elevated ESR normal CRP     History of moderate to severe lumbar stenosis and disc bulges lumbar spine  Osteoarthritis multiple joints  Elevated ESR likely multifactorial no clear history suggestive of PMR or GCA  History of trochanteric bursitis  Incidental patches and dryness of scalp consider psoriasis?  Versus seborrheic dermatosis    Worsening dryness of scalp consider psoriasis will refer to dermatology to confirm diagnosis this could explain her elevated ESR  Reviewed x-ray and lumbar spine MRI.  Patient declined gabapentin pain medications or taking anti-inflammatories regularly  He states pain levels have improved  Offered gabapentin but patient would like to hold on this.  She would like to avoid steroids because of diabetes type 2.  Her last hemoglobin A1c was 7.3  Suspect neuropathic pain    Continue meloxicam 7.5 mg twice a day as needed for arthritic pain/bursitis and likely lumbar radiculitis.  Risk of NSAIDs discussed.    Update CBC CMP ESR CRP will continue to  monitor    Referral for dermatology placed    Reviewed labs from August 2024    CT abdomen pelvis unrevealing other than incidental findings as above from August 2024    Extensive autoimmune workup normal and reviewed from February 2024    If she stays on this long-term we will consider ranitidine.  She is taking it sporadically her pain level is minimal    States she will consider this again    She is doing exercises on her own is not interested in referral to pain management or any other interventions or medications at this time    No history suggestive of PMR or GCA    Age-appropriate malignancy screening through PCP    We will monitor for evolving connective tissue disease    Dermatology confirmed psoriasis of the scalp this could be the reason of her elevated ESR which is more notable with the colder weather     Would like to obtain last [facility] along with her other additional labs.    Continue to sure patient has age-appropriate malignancy screening through PCP including updated colonoscopy    Education and counseling provided to patient.  Instructed patient to call my office or seek medical attention immediately if symptoms worsen. Risks and side effects of medications and diagnosis discussed in detail and patient was given written information on new prescribed medications.    Return to clinic:  Return in about 1 year (around 2/17/2026).    Isa Donis MD  2/6/2024

## 2025-02-18 LAB
ALBUMIN SERPL ELPH-MCNC: 4.21 G/DL (ref 3.75–5.21)
ALBUMIN/GLOB SERPL: 1.17 {RATIO} (ref 1–2)
ALPHA1 GLOB SERPL ELPH-MCNC: 0.26 G/DL (ref 0.19–0.46)
ALPHA2 GLOB SERPL ELPH-MCNC: 1.18 G/DL (ref 0.48–1.05)
B-GLOBULIN SERPL ELPH-MCNC: 0.97 G/DL (ref 0.68–1.23)
GAMMA GLOB SERPL ELPH-MCNC: 1.19 G/DL (ref 0.62–1.7)
KAPPA LC FREE SER-MCNC: 2.03 MG/DL (ref 0.33–1.94)
KAPPA LC FREE/LAMBDA FREE SER NEPH: 2.06 {RATIO} (ref 0.26–1.65)
LAMBDA LC FREE SERPL-MCNC: 0.98 MG/DL (ref 0.57–2.63)
PROT SERPL-MCNC: 7.8 G/DL (ref 5.7–8.2)

## 2025-02-24 ENCOUNTER — TELEPHONE (OUTPATIENT)
Dept: RHEUMATOLOGY | Facility: CLINIC | Age: 65
End: 2025-02-24

## 2025-02-24 NOTE — TELEPHONE ENCOUNTER
Component  Ref Range & Units 7 d ago  (2/17/25) 6 mo ago  (8/6/24) 1 yr ago  (2/6/24) 1 yr ago  (10/30/23)   Sed Rate  0 - 30 mm/Hr 98 High  95 High  91 High  75 High    Resulting Agency [facility]             ESR still elevated    With the possible psoriasis of the scalp I would like her as we discussed in clinic to see a dermatologist to confirm    Slight low white count at 3.9 normal ranges for    Fax to PCP for her to follow-up in between visits

## (undated) NOTE — LETTER
February 6, 2024           No Recipients      Patient: Caitlin Ignacio   YOB: 1960   Date of Visit: 2/6/2024       Dear Dr. Alaina Blake,    I saw your patient, Caitlin Ignacio, on 2/6/2024. Enclosed is my consultation / progress note from that encounter. Thank you for allowing me to participate in the care of this patient.    Sincerely,                           Isa Donis MD  [address], [address] [place] [id number]    Document electronically generated by:  Isa Donis MD on 2/6/2024    CC:   No Recipients    Enclosure

## (undated) NOTE — LETTER
October 30, 2023         Ashkan Hernandez, [address] [facility]  [address]      Patient: Irwin Spurling   YOB: 1960   Date of Visit: 10/30/2023       Dear Dr. Yehuda Faria,    I saw your patient, Irwin Spurling, on 10/30/2023. Enclosed is my consultation / progress note from that encounter. Thank you for allowing me to participate in the care of this patient.     Sincerely,                           Chelsea Pa MD  [facility] [place] [facility], [address] [place], [place] [id number]    Document electronically generated by:  Chelsea Pa MD on 10/30/2023    CC: No Recipients    Enclosure